# Patient Record
Sex: FEMALE | Race: WHITE
[De-identification: names, ages, dates, MRNs, and addresses within clinical notes are randomized per-mention and may not be internally consistent; named-entity substitution may affect disease eponyms.]

---

## 2019-03-24 ENCOUNTER — HOSPITAL ENCOUNTER (INPATIENT)
Dept: HOSPITAL 15 - ER | Age: 54
LOS: 3 days | Discharge: HOME | DRG: 389 | End: 2019-03-27
Attending: INTERNAL MEDICINE | Admitting: NURSE PRACTITIONER
Payer: COMMERCIAL

## 2019-03-24 VITALS — HEIGHT: 66 IN | BODY MASS INDEX: 34.37 KG/M2 | WEIGHT: 213.85 LBS

## 2019-03-24 DIAGNOSIS — K59.00: ICD-10-CM

## 2019-03-24 DIAGNOSIS — M54.9: ICD-10-CM

## 2019-03-24 DIAGNOSIS — E78.5: ICD-10-CM

## 2019-03-24 DIAGNOSIS — Z85.038: ICD-10-CM

## 2019-03-24 DIAGNOSIS — E11.9: ICD-10-CM

## 2019-03-24 DIAGNOSIS — Z79.84: ICD-10-CM

## 2019-03-24 DIAGNOSIS — Z93.3: ICD-10-CM

## 2019-03-24 DIAGNOSIS — E87.1: ICD-10-CM

## 2019-03-24 DIAGNOSIS — Z79.899: ICD-10-CM

## 2019-03-24 DIAGNOSIS — K56.699: Primary | ICD-10-CM

## 2019-03-24 DIAGNOSIS — J44.9: ICD-10-CM

## 2019-03-24 DIAGNOSIS — Z87.891: ICD-10-CM

## 2019-03-24 DIAGNOSIS — E87.6: ICD-10-CM

## 2019-03-24 DIAGNOSIS — Z90.49: ICD-10-CM

## 2019-03-24 DIAGNOSIS — Z90.710: ICD-10-CM

## 2019-03-24 DIAGNOSIS — G43.909: ICD-10-CM

## 2019-03-24 DIAGNOSIS — I10: ICD-10-CM

## 2019-03-24 LAB
ALBUMIN SERPL-MCNC: 3.5 G/DL (ref 3.4–5)
ALP SERPL-CCNC: 115 U/L (ref 45–117)
ALT SERPL-CCNC: 33 U/L (ref 13–56)
AMYLASE SERPL-CCNC: 28 U/L (ref 25–115)
ANION GAP SERPL CALCULATED.3IONS-SCNC: 16 MMOL/L (ref 5–15)
BILIRUB SERPL-MCNC: 0.6 MG/DL (ref 0.2–1)
BUN SERPL-MCNC: 24 MG/DL (ref 7–18)
BUN/CREAT SERPL: 38.1
CALCIUM SERPL-MCNC: 8.6 MG/DL (ref 8.5–10.1)
CHLORIDE SERPL-SCNC: 104 MMOL/L (ref 98–107)
CO2 SERPL-SCNC: 14 MMOL/L (ref 21–32)
GLUCOSE SERPL-MCNC: 166 MG/DL (ref 74–106)
HCT VFR BLD AUTO: 50 % (ref 36–46)
HGB BLD-MCNC: 16.5 G/DL (ref 12.2–16.2)
LIPASE SERPL-CCNC: 114 U/L (ref 73–393)
MCH RBC QN AUTO: 30 PG (ref 28–32)
MCV RBC AUTO: 90.7 FL (ref 80–100)
NRBC BLD QL AUTO: 0.1 %
POTASSIUM SERPL-SCNC: 3.9 MMOL/L (ref 3.5–5.1)
PROT SERPL-MCNC: 8.3 G/DL (ref 6.4–8.2)
SODIUM SERPL-SCNC: 134 MMOL/L (ref 136–145)

## 2019-03-24 PROCEDURE — 80053 COMPREHEN METABOLIC PANEL: CPT

## 2019-03-24 PROCEDURE — 85025 COMPLETE CBC W/AUTO DIFF WBC: CPT

## 2019-03-24 PROCEDURE — 81001 URINALYSIS AUTO W/SCOPE: CPT

## 2019-03-24 PROCEDURE — 82150 ASSAY OF AMYLASE: CPT

## 2019-03-24 PROCEDURE — 36415 COLL VENOUS BLD VENIPUNCTURE: CPT

## 2019-03-24 PROCEDURE — 74176 CT ABD & PELVIS W/O CONTRAST: CPT

## 2019-03-24 PROCEDURE — 83690 ASSAY OF LIPASE: CPT

## 2019-03-24 PROCEDURE — 84484 ASSAY OF TROPONIN QUANT: CPT

## 2019-03-24 PROCEDURE — 96374 THER/PROPH/DIAG INJ IV PUSH: CPT

## 2019-03-24 PROCEDURE — 83735 ASSAY OF MAGNESIUM: CPT

## 2019-03-24 PROCEDURE — 74250 X-RAY XM SM INT 1CNTRST STD: CPT

## 2019-03-24 PROCEDURE — 96375 TX/PRO/DX INJ NEW DRUG ADDON: CPT

## 2019-03-24 PROCEDURE — 74018 RADEX ABDOMEN 1 VIEW: CPT

## 2019-03-24 PROCEDURE — 80048 BASIC METABOLIC PNL TOTAL CA: CPT

## 2019-03-24 PROCEDURE — 82962 GLUCOSE BLOOD TEST: CPT

## 2019-03-24 PROCEDURE — 83036 HEMOGLOBIN GLYCOSYLATED A1C: CPT

## 2019-03-24 PROCEDURE — 93005 ELECTROCARDIOGRAM TRACING: CPT

## 2019-03-24 RX ADMIN — GABAPENTIN SCH MG: 300 CAPSULE ORAL at 22:00

## 2019-03-24 RX ADMIN — MORPHINE SULFATE PRN MG: 2 INJECTION, SOLUTION INTRAMUSCULAR; INTRAVENOUS at 21:30

## 2019-03-25 VITALS — DIASTOLIC BLOOD PRESSURE: 71 MMHG | SYSTOLIC BLOOD PRESSURE: 152 MMHG

## 2019-03-25 VITALS — SYSTOLIC BLOOD PRESSURE: 119 MMHG | DIASTOLIC BLOOD PRESSURE: 81 MMHG

## 2019-03-25 VITALS — DIASTOLIC BLOOD PRESSURE: 97 MMHG | SYSTOLIC BLOOD PRESSURE: 141 MMHG

## 2019-03-25 LAB
ANION GAP SERPL CALCULATED.3IONS-SCNC: 14 MMOL/L (ref 5–15)
BUN SERPL-MCNC: 28 MG/DL (ref 7–18)
BUN/CREAT SERPL: 51.9
CALCIUM SERPL-MCNC: 8.4 MG/DL (ref 8.5–10.1)
CHLORIDE SERPL-SCNC: 107 MMOL/L (ref 98–107)
CO2 SERPL-SCNC: 18 MMOL/L (ref 21–32)
GLUCOSE SERPL-MCNC: 128 MG/DL (ref 74–106)
HCT VFR BLD AUTO: 48.1 % (ref 36–46)
HGB BLD-MCNC: 16.2 G/DL (ref 12.2–16.2)
MCH RBC QN AUTO: 30.4 PG (ref 28–32)
MCV RBC AUTO: 90.4 FL (ref 80–100)
NRBC BLD QL AUTO: 0.1 %
POTASSIUM SERPL-SCNC: 3.8 MMOL/L (ref 3.5–5.1)
SODIUM SERPL-SCNC: 139 MMOL/L (ref 136–145)

## 2019-03-25 PROCEDURE — 0DH67UZ INSERTION OF FEEDING DEVICE INTO STOMACH, VIA NATURAL OR ARTIFICIAL OPENING: ICD-10-PCS

## 2019-03-25 RX ADMIN — Medication SCH STRIP: at 00:04

## 2019-03-25 RX ADMIN — MORPHINE SULFATE PRN MG: 2 INJECTION, SOLUTION INTRAMUSCULAR; INTRAVENOUS at 21:02

## 2019-03-25 RX ADMIN — SODIUM CHLORIDE SCH MLS/HR: 0.9 INJECTION, SOLUTION INTRAVENOUS at 13:00

## 2019-03-25 RX ADMIN — HUMAN INSULIN SCH UNITS: 100 INJECTION, SOLUTION SUBCUTANEOUS at 06:19

## 2019-03-25 RX ADMIN — GABAPENTIN SCH MG: 300 CAPSULE ORAL at 14:17

## 2019-03-25 RX ADMIN — ATENOLOL SCH MG: 50 TABLET ORAL at 21:09

## 2019-03-25 RX ADMIN — Medication SCH STRIP: at 06:12

## 2019-03-25 RX ADMIN — ONDANSETRON HYDROCHLORIDE PRN MG: 2 INJECTION, SOLUTION INTRAMUSCULAR; INTRAVENOUS at 09:14

## 2019-03-25 RX ADMIN — ATENOLOL SCH MG: 50 TABLET ORAL at 10:46

## 2019-03-25 RX ADMIN — SODIUM CHLORIDE SCH MLS/HR: 0.9 INJECTION, SOLUTION INTRAVENOUS at 23:43

## 2019-03-25 RX ADMIN — Medication SCH STRIP: at 18:19

## 2019-03-25 RX ADMIN — BENAZEPRIL HYDROCHLORIDE SCH MG: 10 TABLET, FILM COATED ORAL at 10:44

## 2019-03-25 RX ADMIN — ONDANSETRON HYDROCHLORIDE PRN MG: 2 INJECTION, SOLUTION INTRAMUSCULAR; INTRAVENOUS at 21:01

## 2019-03-25 RX ADMIN — HUMAN INSULIN SCH UNITS: 100 INJECTION, SOLUTION SUBCUTANEOUS at 18:00

## 2019-03-25 RX ADMIN — PANTOPRAZOLE SODIUM SCH MG: 40 INJECTION, POWDER, FOR SOLUTION INTRAVENOUS at 21:01

## 2019-03-25 RX ADMIN — Medication SCH STRIP: at 12:21

## 2019-03-25 RX ADMIN — HUMAN INSULIN SCH UNITS: 100 INJECTION, SOLUTION SUBCUTANEOUS at 00:13

## 2019-03-25 RX ADMIN — CEFTRIAXONE SODIUM SCH MLS/HR: 1 INJECTION, POWDER, FOR SOLUTION INTRAMUSCULAR; INTRAVENOUS at 09:14

## 2019-03-25 RX ADMIN — GABAPENTIN SCH MG: 300 CAPSULE ORAL at 21:02

## 2019-03-25 RX ADMIN — ONDANSETRON HYDROCHLORIDE PRN MG: 2 INJECTION, SOLUTION INTRAMUSCULAR; INTRAVENOUS at 15:22

## 2019-03-25 RX ADMIN — HUMAN INSULIN SCH UNITS: 100 INJECTION, SOLUTION SUBCUTANEOUS at 12:28

## 2019-03-25 RX ADMIN — GABAPENTIN SCH MG: 300 CAPSULE ORAL at 06:00

## 2019-03-25 RX ADMIN — MORPHINE SULFATE PRN MG: 2 INJECTION, SOLUTION INTRAMUSCULAR; INTRAVENOUS at 15:30

## 2019-03-25 NOTE — NUR
CLOSING NOTES



PT RESTING. DENIES PAIN, NAUSEA OR VOMITING. VERBALIZED COMFORT.  ENDORSED CARE TO ISA WALLACE RN.

## 2019-03-25 NOTE — NUR
MS admit from ER

[place],ROGELIO ANGELES admitted to Telemetry unit after SBAR received.  Patient oriented to Lorin Brunson, primary RN, unit, room, bed, and unit policies regarding patient care and 
visiting hours. Patient placed on bedside oxygen, weighed by bedscale and encouraged to call 
if they need something. All questions and concerns addressed, patient verbalized 
understanding. 

Note: 

NG TUBE ATTACHED TO LOW INTERMITTENT SUCTION. NO DRAINAGE NOTED AT THIS TIME

## 2019-03-25 NOTE — NUR
BLOOD SUGAR 156

INSULIN HELD. PT IS ON CLEAR LIQUID DIET ONLY AND WOULD LIKE TO SEE HOW THE NEXT BLOOD SUGAR 
LEVEL WILL BE TONIGHT

## 2019-03-25 NOTE — NUR
MS admit from [facility],ROGELIO ANGELES admitted to Telemetry unit after SBAR received.  Patient oriented to Lorin Brunson, primary RN, unit, room, bed, and unit policies regarding patient care and 
visiting hours. Patient placed on bedside oxygen, weighed by bedscale and encouraged to call 
if they need something. All questions and concerns addressed, patient verbalized 
understanding. 

Note: 

NG TUBE ATTACHED TO LOW INTERMITTENT SUCTION. NO DRAINAGE NOTED AT THIS TIME

-------------------------------------------------------------------------------

Addendum: 03/25/19 at 1646 by Lorin Brunson RN

-------------------------------------------------------------------------------

DUPLICATE NOTE. WRONG TIME.

## 2019-03-26 VITALS — DIASTOLIC BLOOD PRESSURE: 88 MMHG | SYSTOLIC BLOOD PRESSURE: 152 MMHG

## 2019-03-26 VITALS — SYSTOLIC BLOOD PRESSURE: 160 MMHG | DIASTOLIC BLOOD PRESSURE: 72 MMHG

## 2019-03-26 VITALS — SYSTOLIC BLOOD PRESSURE: 144 MMHG | DIASTOLIC BLOOD PRESSURE: 75 MMHG

## 2019-03-26 VITALS — SYSTOLIC BLOOD PRESSURE: 140 MMHG | DIASTOLIC BLOOD PRESSURE: 78 MMHG

## 2019-03-26 VITALS — DIASTOLIC BLOOD PRESSURE: 79 MMHG | SYSTOLIC BLOOD PRESSURE: 137 MMHG

## 2019-03-26 LAB
ANION GAP SERPL CALCULATED.3IONS-SCNC: 12 MMOL/L (ref 5–15)
BUN SERPL-MCNC: 26 MG/DL (ref 7–18)
BUN/CREAT SERPL: 53.1
CALCIUM SERPL-MCNC: 8 MG/DL (ref 8.5–10.1)
CHLORIDE SERPL-SCNC: 105 MMOL/L (ref 98–107)
CO2 SERPL-SCNC: 19 MMOL/L (ref 21–32)
GLUCOSE SERPL-MCNC: 174 MG/DL (ref 74–106)
HCT VFR BLD AUTO: 46.2 % (ref 36–46)
HGB BLD-MCNC: 15.4 G/DL (ref 12.2–16.2)
MAGNESIUM SERPL-MCNC: 2.1 MG/DL (ref 1.6–2.6)
MCH RBC QN AUTO: 30.1 PG (ref 28–32)
MCV RBC AUTO: 90.4 FL (ref 80–100)
NRBC BLD QL AUTO: 0.1 %
POTASSIUM SERPL-SCNC: 3.2 MMOL/L (ref 3.5–5.1)
SODIUM SERPL-SCNC: 136 MMOL/L (ref 136–145)

## 2019-03-26 RX ADMIN — PANTOPRAZOLE SODIUM SCH MG: 40 INJECTION, POWDER, FOR SOLUTION INTRAVENOUS at 10:30

## 2019-03-26 RX ADMIN — Medication SCH STRIP: at 05:55

## 2019-03-26 RX ADMIN — ONDANSETRON HYDROCHLORIDE PRN MG: 2 INJECTION, SOLUTION INTRAMUSCULAR; INTRAVENOUS at 14:53

## 2019-03-26 RX ADMIN — ONDANSETRON HYDROCHLORIDE PRN MG: 2 INJECTION, SOLUTION INTRAMUSCULAR; INTRAVENOUS at 10:30

## 2019-03-26 RX ADMIN — ATENOLOL SCH MG: 50 TABLET ORAL at 21:40

## 2019-03-26 RX ADMIN — MORPHINE SULFATE PRN MG: 2 INJECTION, SOLUTION INTRAMUSCULAR; INTRAVENOUS at 10:30

## 2019-03-26 RX ADMIN — MORPHINE SULFATE PRN MG: 2 INJECTION, SOLUTION INTRAMUSCULAR; INTRAVENOUS at 23:50

## 2019-03-26 RX ADMIN — Medication SCH STRIP: at 23:50

## 2019-03-26 RX ADMIN — MORPHINE SULFATE PRN MG: 2 INJECTION, SOLUTION INTRAMUSCULAR; INTRAVENOUS at 19:52

## 2019-03-26 RX ADMIN — MORPHINE SULFATE PRN MG: 2 INJECTION, SOLUTION INTRAMUSCULAR; INTRAVENOUS at 05:55

## 2019-03-26 RX ADMIN — SODIUM CHLORIDE SCH MLS/HR: 0.9 INJECTION, SOLUTION INTRAVENOUS at 12:04

## 2019-03-26 RX ADMIN — Medication SCH STRIP: at 00:00

## 2019-03-26 RX ADMIN — BENAZEPRIL HYDROCHLORIDE SCH MG: 10 TABLET, FILM COATED ORAL at 10:36

## 2019-03-26 RX ADMIN — MORPHINE SULFATE PRN MG: 2 INJECTION, SOLUTION INTRAMUSCULAR; INTRAVENOUS at 14:53

## 2019-03-26 RX ADMIN — GABAPENTIN SCH MG: 300 CAPSULE ORAL at 05:54

## 2019-03-26 RX ADMIN — HUMAN INSULIN SCH UNITS: 100 INJECTION, SOLUTION SUBCUTANEOUS at 12:05

## 2019-03-26 RX ADMIN — HUMAN INSULIN SCH UNITS: 100 INJECTION, SOLUTION SUBCUTANEOUS at 00:00

## 2019-03-26 RX ADMIN — ATENOLOL SCH MG: 50 TABLET ORAL at 10:37

## 2019-03-26 RX ADMIN — GABAPENTIN SCH MG: 300 CAPSULE ORAL at 21:39

## 2019-03-26 RX ADMIN — GABAPENTIN SCH MG: 300 CAPSULE ORAL at 14:00

## 2019-03-26 RX ADMIN — PANTOPRAZOLE SODIUM SCH MG: 40 INJECTION, POWDER, FOR SOLUTION INTRAVENOUS at 21:40

## 2019-03-26 RX ADMIN — ONDANSETRON HYDROCHLORIDE PRN MG: 2 INJECTION, SOLUTION INTRAMUSCULAR; INTRAVENOUS at 05:55

## 2019-03-26 RX ADMIN — ONDANSETRON HYDROCHLORIDE PRN MG: 2 INJECTION, SOLUTION INTRAMUSCULAR; INTRAVENOUS at 19:52

## 2019-03-26 RX ADMIN — ONDANSETRON HYDROCHLORIDE PRN MG: 2 INJECTION, SOLUTION INTRAMUSCULAR; INTRAVENOUS at 23:50

## 2019-03-26 RX ADMIN — HUMAN INSULIN SCH UNITS: 100 INJECTION, SOLUTION SUBCUTANEOUS at 06:01

## 2019-03-26 RX ADMIN — HUMAN INSULIN SCH UNITS: 100 INJECTION, SOLUTION SUBCUTANEOUS at 18:00

## 2019-03-26 RX ADMIN — CEFTRIAXONE SODIUM SCH MLS/HR: 1 INJECTION, POWDER, FOR SOLUTION INTRAMUSCULAR; INTRAVENOUS at 10:30

## 2019-03-26 RX ADMIN — Medication SCH STRIP: at 12:04

## 2019-03-26 RX ADMIN — HUMAN INSULIN SCH UNITS: 100 INJECTION, SOLUTION SUBCUTANEOUS at 23:58

## 2019-03-26 RX ADMIN — Medication SCH STRIP: at 18:00

## 2019-03-26 NOTE — NUR
Closing Note: Patient A/O x-4 and not exhibiting  any s/s of distress nor discomfort. Care 
endorsed to Ayesha Vásquez RN.

## 2019-03-26 NOTE — NUR
Opening shift note: Patient is A/O x-4 and laying in bed quietly, call light is within reach 
and bed in low position with side rails up           x-2. Patient is not stating any pain 
nor discomfort at this time. Patient encouraged to call when she starts to feel pain in 
attempt to keep her pain controlled. Patient verbalized understanding.

## 2019-03-27 VITALS — DIASTOLIC BLOOD PRESSURE: 87 MMHG | SYSTOLIC BLOOD PRESSURE: 139 MMHG

## 2019-03-27 VITALS — SYSTOLIC BLOOD PRESSURE: 136 MMHG | DIASTOLIC BLOOD PRESSURE: 74 MMHG

## 2019-03-27 VITALS — DIASTOLIC BLOOD PRESSURE: 78 MMHG | SYSTOLIC BLOOD PRESSURE: 143 MMHG

## 2019-03-27 LAB
ANION GAP SERPL CALCULATED.3IONS-SCNC: 7 MMOL/L (ref 5–15)
BUN SERPL-MCNC: 14 MG/DL (ref 7–18)
BUN/CREAT SERPL: 32.6
CALCIUM SERPL-MCNC: 8.2 MG/DL (ref 8.5–10.1)
CHLORIDE SERPL-SCNC: 99 MMOL/L (ref 98–107)
CO2 SERPL-SCNC: 27 MMOL/L (ref 21–32)
GLUCOSE SERPL-MCNC: 174 MG/DL (ref 74–106)
POTASSIUM SERPL-SCNC: 3.1 MMOL/L (ref 3.5–5.1)
SODIUM SERPL-SCNC: 133 MMOL/L (ref 136–145)

## 2019-03-27 RX ADMIN — ONDANSETRON HYDROCHLORIDE PRN MG: 2 INJECTION, SOLUTION INTRAMUSCULAR; INTRAVENOUS at 11:12

## 2019-03-27 RX ADMIN — GABAPENTIN SCH MG: 300 CAPSULE ORAL at 14:00

## 2019-03-27 RX ADMIN — HUMAN INSULIN SCH UNITS: 100 INJECTION, SOLUTION SUBCUTANEOUS at 05:35

## 2019-03-27 RX ADMIN — Medication SCH STRIP: at 05:16

## 2019-03-27 RX ADMIN — ONDANSETRON HYDROCHLORIDE PRN MG: 2 INJECTION, SOLUTION INTRAMUSCULAR; INTRAVENOUS at 05:16

## 2019-03-27 RX ADMIN — SODIUM CHLORIDE SCH MLS/HR: 0.9 INJECTION, SOLUTION INTRAVENOUS at 04:25

## 2019-03-27 RX ADMIN — PANTOPRAZOLE SODIUM SCH MG: 40 INJECTION, POWDER, FOR SOLUTION INTRAVENOUS at 11:11

## 2019-03-27 RX ADMIN — MORPHINE SULFATE PRN MG: 2 INJECTION, SOLUTION INTRAMUSCULAR; INTRAVENOUS at 11:12

## 2019-03-27 RX ADMIN — HUMAN INSULIN SCH UNITS: 100 INJECTION, SOLUTION SUBCUTANEOUS at 12:00

## 2019-03-27 RX ADMIN — MORPHINE SULFATE PRN MG: 2 INJECTION, SOLUTION INTRAMUSCULAR; INTRAVENOUS at 05:16

## 2019-03-27 RX ADMIN — GABAPENTIN SCH MG: 300 CAPSULE ORAL at 05:16

## 2019-03-27 RX ADMIN — BENAZEPRIL HYDROCHLORIDE SCH MG: 10 TABLET, FILM COATED ORAL at 11:11

## 2019-03-27 RX ADMIN — Medication SCH STRIP: at 12:00

## 2019-03-27 RX ADMIN — ATENOLOL SCH MG: 50 TABLET ORAL at 11:11

## 2019-03-27 NOTE — NUR
Discharge instructions given as ordered. Encourage to follow up with PMD as instructed. All 
questions and concerns addressed. Patient verbalized understanding.  Medication 
reconciliation form completed and copy given to patient. IV removed with catheter intact, 
pressure dressing applied. Patient taken to vehicle via wheelchair with all personal 
belongings, accompanied by staff and family member. No distress noted at time of departure.

## 2019-03-27 NOTE — NUR
Opening shift note: Patient A/O x-4 and resting in bed and not stating any pain nor 
disomfort at this time. Patient encouraged to call if she needs anything.

## 2022-10-31 ENCOUNTER — HOSPITAL ENCOUNTER (EMERGENCY)
Dept: HOSPITAL 15 - ER | Age: 57
Discharge: HOME | End: 2022-10-31
Payer: COMMERCIAL

## 2022-10-31 VITALS — SYSTOLIC BLOOD PRESSURE: 168 MMHG | DIASTOLIC BLOOD PRESSURE: 82 MMHG

## 2022-10-31 VITALS — HEIGHT: 67 IN | WEIGHT: 238.1 LBS | BODY MASS INDEX: 37.37 KG/M2
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
## 2022-10-31 DIAGNOSIS — Z90.710: ICD-10-CM

## 2022-10-31 DIAGNOSIS — S52.501A: Primary | ICD-10-CM

## 2022-10-31 DIAGNOSIS — Z87.891: ICD-10-CM

## 2022-10-31 DIAGNOSIS — Z79.899: ICD-10-CM

## 2022-10-31 DIAGNOSIS — S52.601A: ICD-10-CM
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
## 2022-10-31 DIAGNOSIS — S63.502A: ICD-10-CM

## 2022-10-31 DIAGNOSIS — S83.92XA: ICD-10-CM
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
## 2022-10-31 DIAGNOSIS — I10: ICD-10-CM

## 2022-10-31 DIAGNOSIS — Z90.89: ICD-10-CM

## 2022-10-31 DIAGNOSIS — E11.9: ICD-10-CM
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
## 2022-10-31 DIAGNOSIS — J44.9: ICD-10-CM

## 2022-10-31 DIAGNOSIS — E78.5: ICD-10-CM

## 2022-10-31 PROCEDURE — 29505 APPLICATION LONG LEG SPLINT: CPT

## 2022-10-31 PROCEDURE — 73562 X-RAY EXAM OF KNEE 3: CPT

## 2022-10-31 PROCEDURE — 99284 EMERGENCY DEPT VISIT MOD MDM: CPT

## 2022-10-31 PROCEDURE — 29125 APPL SHORT ARM SPLINT STATIC: CPT

## 2022-10-31 PROCEDURE — 73110 X-RAY EXAM OF WRIST: CPT

## 2022-10-31 PROCEDURE — 73130 X-RAY EXAM OF HAND: CPT

## 2023-03-26 ENCOUNTER — HOSPITAL ENCOUNTER (INPATIENT)
Dept: HOSPITAL 15 - ER | Age: 58
LOS: 9 days | Discharge: HOME | DRG: 872 | End: 2023-04-04
Attending: NURSE PRACTITIONER
Payer: COMMERCIAL

## 2023-03-26 VITALS — BODY MASS INDEX: 35.09 KG/M2 | HEIGHT: 67 IN | WEIGHT: 223.55 LBS

## 2023-03-26 DIAGNOSIS — M19.90: ICD-10-CM

## 2023-03-26 DIAGNOSIS — Z82.49: ICD-10-CM

## 2023-03-26 DIAGNOSIS — Z80.0: ICD-10-CM

## 2023-03-26 DIAGNOSIS — C20: ICD-10-CM

## 2023-03-26 DIAGNOSIS — J44.9: ICD-10-CM

## 2023-03-26 DIAGNOSIS — Z20.822: ICD-10-CM

## 2023-03-26 DIAGNOSIS — Z80.3: ICD-10-CM

## 2023-03-26 DIAGNOSIS — E66.01: ICD-10-CM

## 2023-03-26 DIAGNOSIS — C78.7: ICD-10-CM

## 2023-03-26 DIAGNOSIS — K59.00: ICD-10-CM

## 2023-03-26 DIAGNOSIS — Z80.1: ICD-10-CM

## 2023-03-26 DIAGNOSIS — E11.42: ICD-10-CM

## 2023-03-26 DIAGNOSIS — A41.9: Primary | ICD-10-CM

## 2023-03-26 DIAGNOSIS — M06.9: ICD-10-CM

## 2023-03-26 DIAGNOSIS — G89.4: ICD-10-CM

## 2023-03-26 DIAGNOSIS — Z83.3: ICD-10-CM

## 2023-03-26 DIAGNOSIS — N30.01: ICD-10-CM

## 2023-03-26 DIAGNOSIS — I10: ICD-10-CM

## 2023-03-26 DIAGNOSIS — E78.5: ICD-10-CM

## 2023-03-26 LAB
ALBUMIN SERPL-MCNC: 3.2 G/DL (ref 3.4–5)
ALP SERPL-CCNC: 128 U/L (ref 45–117)
ALT SERPL-CCNC: 29 U/L (ref 13–56)
ANION GAP SERPL CALCULATED.3IONS-SCNC: 8 MMOL/L (ref 5–15)
BILIRUB SERPL-MCNC: 0.5 MG/DL (ref 0.2–1)
BUN SERPL-MCNC: 15 MG/DL (ref 7–18)
BUN/CREAT SERPL: 23.1 (ref 10–20)
CALCIUM SERPL-MCNC: 8.7 MG/DL (ref 8.5–10.1)
CHLORIDE SERPL-SCNC: 108 MMOL/L (ref 98–107)
CHOLEST SERPL-MCNC: 149 MG/DL (ref ?–200)
CO2 SERPL-SCNC: 22 MMOL/L (ref 21–32)
GLUCOSE SERPL-MCNC: 177 MG/DL (ref 74–106)
HCT VFR BLD AUTO: 46.2 % (ref 36–46)
HDLC SERPL-MCNC: 39 MG/DL (ref 40–59)
HGB BLD-MCNC: 15.6 G/DL (ref 12.2–16.2)
MCH RBC QN AUTO: 29.9 PG (ref 28–32)
MCV RBC AUTO: 88.4 FL (ref 80–100)
NRBC BLD QL AUTO: 0.5 %
POTASSIUM SERPL-SCNC: 3.5 MMOL/L (ref 3.5–5.1)
PROT SERPL-MCNC: 8 G/DL (ref 6.4–8.2)
SODIUM SERPL-SCNC: 138 MMOL/L (ref 136–145)
TRIGL SERPL-MCNC: 141 MG/DL (ref ?–150)

## 2023-03-26 PROCEDURE — 85025 COMPLETE CBC W/AUTO DIFF WBC: CPT

## 2023-03-26 PROCEDURE — 83036 HEMOGLOBIN GLYCOSYLATED A1C: CPT

## 2023-03-26 PROCEDURE — 87088 URINE BACTERIA CULTURE: CPT

## 2023-03-26 PROCEDURE — 74176 CT ABD & PELVIS W/O CONTRAST: CPT

## 2023-03-26 PROCEDURE — 81001 URINALYSIS AUTO W/SCOPE: CPT

## 2023-03-26 PROCEDURE — 84132 ASSAY OF SERUM POTASSIUM: CPT

## 2023-03-26 PROCEDURE — 85730 THROMBOPLASTIN TIME PARTIAL: CPT

## 2023-03-26 PROCEDURE — 84443 ASSAY THYROID STIM HORMONE: CPT

## 2023-03-26 PROCEDURE — 76705 ECHO EXAM OF ABDOMEN: CPT

## 2023-03-26 PROCEDURE — 87040 BLOOD CULTURE FOR BACTERIA: CPT

## 2023-03-26 PROCEDURE — 87086 URINE CULTURE/COLONY COUNT: CPT

## 2023-03-26 PROCEDURE — 82962 GLUCOSE BLOOD TEST: CPT

## 2023-03-26 PROCEDURE — 84484 ASSAY OF TROPONIN QUANT: CPT

## 2023-03-26 PROCEDURE — 85610 PROTHROMBIN TIME: CPT

## 2023-03-26 PROCEDURE — 80048 BASIC METABOLIC PNL TOTAL CA: CPT

## 2023-03-26 PROCEDURE — 77012 CT SCAN FOR NEEDLE BIOPSY: CPT

## 2023-03-26 PROCEDURE — 87426 SARSCOV CORONAVIRUS AG IA: CPT

## 2023-03-26 PROCEDURE — 82105 ALPHA-FETOPROTEIN SERUM: CPT

## 2023-03-26 PROCEDURE — 82378 CARCINOEMBRYONIC ANTIGEN: CPT

## 2023-03-26 PROCEDURE — 80061 LIPID PANEL: CPT

## 2023-03-26 PROCEDURE — 10005 FNA BX W/US GDN 1ST LES: CPT

## 2023-03-26 PROCEDURE — 36415 COLL VENOUS BLD VENIPUNCTURE: CPT

## 2023-03-26 PROCEDURE — 80053 COMPREHEN METABOLIC PANEL: CPT

## 2023-03-26 PROCEDURE — 74183 MRI ABD W/O CNTR FLWD CNTR: CPT

## 2023-03-26 RX ADMIN — DICYCLOMINE HYDROCHLORIDE SCH MG: 10 CAPSULE ORAL at 22:05

## 2023-03-26 RX ADMIN — HYDROCODONE BITARTRATE AND ACETAMINOPHEN PRN TAB: 5; 325 TABLET ORAL at 16:37

## 2023-03-26 RX ADMIN — ONDANSETRON HYDROCHLORIDE PRN MG: 2 INJECTION, SOLUTION INTRAMUSCULAR; INTRAVENOUS at 22:11

## 2023-03-26 RX ADMIN — Medication SCH STRIP: at 17:43

## 2023-03-26 RX ADMIN — Medication SCH STRIP: at 22:12

## 2023-03-26 RX ADMIN — ATENOLOL SCH MG: 50 TABLET ORAL at 22:09

## 2023-03-26 RX ADMIN — MORPHINE SULFATE PRN MG: 2 INJECTION, SOLUTION INTRAMUSCULAR; INTRAVENOUS at 22:12

## 2023-03-26 RX ADMIN — HUMAN INSULIN SCH UNITS: 100 INJECTION, SOLUTION SUBCUTANEOUS at 22:21

## 2023-03-26 RX ADMIN — GABAPENTIN SCH MG: 300 CAPSULE ORAL at 22:05

## 2023-03-26 RX ADMIN — HUMAN INSULIN SCH UNITS: 100 INJECTION, SOLUTION SUBCUTANEOUS at 17:48

## 2023-03-26 RX ADMIN — HYDROCODONE BITARTRATE AND ACETAMINOPHEN PRN TAB: 5; 325 TABLET ORAL at 22:52

## 2023-03-27 VITALS — SYSTOLIC BLOOD PRESSURE: 187 MMHG | DIASTOLIC BLOOD PRESSURE: 80 MMHG

## 2023-03-27 VITALS — DIASTOLIC BLOOD PRESSURE: 78 MMHG | SYSTOLIC BLOOD PRESSURE: 186 MMHG

## 2023-03-27 VITALS — SYSTOLIC BLOOD PRESSURE: 189 MMHG | DIASTOLIC BLOOD PRESSURE: 75 MMHG

## 2023-03-27 LAB
ALBUMIN SERPL-MCNC: 2.9 G/DL (ref 3.4–5)
ALP SERPL-CCNC: 113 U/L (ref 45–117)
ALT SERPL-CCNC: 22 U/L (ref 13–56)
ANION GAP SERPL CALCULATED.3IONS-SCNC: 9 MMOL/L (ref 5–15)
BILIRUB SERPL-MCNC: 0.5 MG/DL (ref 0.2–1)
BUN SERPL-MCNC: 18 MG/DL (ref 7–18)
BUN/CREAT SERPL: 40.9 (ref 10–20)
CALCIUM SERPL-MCNC: 8.8 MG/DL (ref 8.5–10.1)
CHLORIDE SERPL-SCNC: 106 MMOL/L (ref 98–107)
CO2 SERPL-SCNC: 23 MMOL/L (ref 21–32)
GLUCOSE SERPL-MCNC: 217 MG/DL (ref 74–106)
HCT VFR BLD AUTO: 43.6 % (ref 36–46)
HGB BLD-MCNC: 14.8 G/DL (ref 12.2–16.2)
MCH RBC QN AUTO: 30 PG (ref 28–32)
MCV RBC AUTO: 88.6 FL (ref 80–100)
NRBC BLD QL AUTO: 0.1 %
POTASSIUM SERPL-SCNC: 3.5 MMOL/L (ref 3.5–5.1)
PROT SERPL-MCNC: 8.1 G/DL (ref 6.4–8.2)
SODIUM SERPL-SCNC: 138 MMOL/L (ref 136–145)

## 2023-03-27 RX ADMIN — DICYCLOMINE HYDROCHLORIDE SCH MG: 10 CAPSULE ORAL at 08:47

## 2023-03-27 RX ADMIN — Medication SCH STRIP: at 21:20

## 2023-03-27 RX ADMIN — MORPHINE SULFATE PRN MG: 2 INJECTION, SOLUTION INTRAMUSCULAR; INTRAVENOUS at 01:56

## 2023-03-27 RX ADMIN — ENOXAPARIN SODIUM SCH MG: 40 INJECTION SUBCUTANEOUS at 09:24

## 2023-03-27 RX ADMIN — HUMAN INSULIN SCH UNITS: 100 INJECTION, SOLUTION SUBCUTANEOUS at 06:50

## 2023-03-27 RX ADMIN — GABAPENTIN SCH MG: 300 CAPSULE ORAL at 21:16

## 2023-03-27 RX ADMIN — ONDANSETRON HYDROCHLORIDE PRN MG: 2 INJECTION, SOLUTION INTRAMUSCULAR; INTRAVENOUS at 06:09

## 2023-03-27 RX ADMIN — HYDROCODONE BITARTRATE AND ACETAMINOPHEN PRN TAB: 5; 325 TABLET ORAL at 09:22

## 2023-03-27 RX ADMIN — Medication SCH STRIP: at 17:46

## 2023-03-27 RX ADMIN — PREGABALIN SCH MG: 25 CAPSULE ORAL at 21:15

## 2023-03-27 RX ADMIN — ONDANSETRON HYDROCHLORIDE PRN MG: 2 INJECTION, SOLUTION INTRAMUSCULAR; INTRAVENOUS at 01:56

## 2023-03-27 RX ADMIN — HUMAN INSULIN SCH UNITS: 100 INJECTION, SOLUTION SUBCUTANEOUS at 16:25

## 2023-03-27 RX ADMIN — GABAPENTIN SCH MG: 300 CAPSULE ORAL at 06:09

## 2023-03-27 RX ADMIN — HYDROMORPHONE HYDROCHLORIDE PRN MG: 2 INJECTION, SOLUTION INTRAMUSCULAR; INTRAVENOUS; SUBCUTANEOUS at 21:12

## 2023-03-27 RX ADMIN — HYDROCODONE BITARTRATE AND ACETAMINOPHEN PRN TAB: 5; 325 TABLET ORAL at 14:23

## 2023-03-27 RX ADMIN — ATENOLOL SCH MG: 50 TABLET ORAL at 21:16

## 2023-03-27 RX ADMIN — Medication SCH STRIP: at 07:06

## 2023-03-27 RX ADMIN — HYDROCODONE BITARTRATE AND ACETAMINOPHEN PRN TAB: 5; 325 TABLET ORAL at 05:02

## 2023-03-27 RX ADMIN — Medication SCH STRIP: at 11:16

## 2023-03-27 RX ADMIN — HYDRALAZINE HYDROCHLORIDE PRN MG: 20 INJECTION INTRAMUSCULAR; INTRAVENOUS at 14:49

## 2023-03-27 RX ADMIN — ATENOLOL SCH MG: 50 TABLET ORAL at 08:47

## 2023-03-27 RX ADMIN — PIPERACILLIN SODIUM AND TAZOBACTAM SODIUM SCH MLS/HR: .375; 3 INJECTION, POWDER, LYOPHILIZED, FOR SOLUTION INTRAVENOUS at 17:52

## 2023-03-27 RX ADMIN — HUMAN INSULIN SCH UNITS: 100 INJECTION, SOLUTION SUBCUTANEOUS at 11:16

## 2023-03-27 RX ADMIN — GABAPENTIN SCH MG: 300 CAPSULE ORAL at 14:21

## 2023-03-28 VITALS — DIASTOLIC BLOOD PRESSURE: 61 MMHG | SYSTOLIC BLOOD PRESSURE: 105 MMHG

## 2023-03-28 VITALS — DIASTOLIC BLOOD PRESSURE: 82 MMHG | SYSTOLIC BLOOD PRESSURE: 175 MMHG

## 2023-03-28 VITALS — SYSTOLIC BLOOD PRESSURE: 146 MMHG | DIASTOLIC BLOOD PRESSURE: 75 MMHG

## 2023-03-28 VITALS — DIASTOLIC BLOOD PRESSURE: 67 MMHG | SYSTOLIC BLOOD PRESSURE: 137 MMHG

## 2023-03-28 VITALS — SYSTOLIC BLOOD PRESSURE: 116 MMHG | DIASTOLIC BLOOD PRESSURE: 63 MMHG

## 2023-03-28 LAB
ALBUMIN SERPL-MCNC: 2.8 G/DL (ref 3.4–5)
ALP SERPL-CCNC: 119 U/L (ref 45–117)
ALT SERPL-CCNC: 24 U/L (ref 13–56)
ANION GAP SERPL CALCULATED.3IONS-SCNC: 7 MMOL/L (ref 5–15)
APTT PPP: 27.6 SEC (ref 24.6–33.4)
BILIRUB SERPL-MCNC: 0.8 MG/DL (ref 0.2–1)
BUN SERPL-MCNC: 26 MG/DL (ref 7–18)
BUN/CREAT SERPL: 38.2 (ref 10–20)
CALCIUM SERPL-MCNC: 8.7 MG/DL (ref 8.5–10.1)
CHLORIDE SERPL-SCNC: 103 MMOL/L (ref 98–107)
CO2 SERPL-SCNC: 25 MMOL/L (ref 21–32)
GLUCOSE SERPL-MCNC: 281 MG/DL (ref 74–106)
HCT VFR BLD AUTO: 44.1 % (ref 36–46)
HGB BLD-MCNC: 14.5 G/DL (ref 12.2–16.2)
INR PPP: 1.18 (ref 0.9–1.15)
MCH RBC QN AUTO: 29 PG (ref 28–32)
MCV RBC AUTO: 88.1 FL (ref 80–100)
NRBC BLD QL AUTO: 0.1 %
POTASSIUM SERPL-SCNC: 3.1 MMOL/L (ref 3.5–5.1)
PROT SERPL-MCNC: 7.8 G/DL (ref 6.4–8.2)
SODIUM SERPL-SCNC: 135 MMOL/L (ref 136–145)

## 2023-03-28 RX ADMIN — HYDROCODONE BITARTRATE AND ACETAMINOPHEN PRN TAB: 5; 325 TABLET ORAL at 04:47

## 2023-03-28 RX ADMIN — PREGABALIN SCH MG: 25 CAPSULE ORAL at 21:53

## 2023-03-28 RX ADMIN — HYDROCODONE BITARTRATE AND ACETAMINOPHEN PRN TAB: 5; 325 TABLET ORAL at 19:00

## 2023-03-28 RX ADMIN — PIPERACILLIN SODIUM AND TAZOBACTAM SODIUM SCH MLS/HR: .375; 3 INJECTION, POWDER, LYOPHILIZED, FOR SOLUTION INTRAVENOUS at 15:58

## 2023-03-28 RX ADMIN — GABAPENTIN SCH MG: 300 CAPSULE ORAL at 06:25

## 2023-03-28 RX ADMIN — Medication SCH STRIP: at 21:54

## 2023-03-28 RX ADMIN — HUMAN INSULIN SCH UNITS: 100 INJECTION, SOLUTION SUBCUTANEOUS at 21:55

## 2023-03-28 RX ADMIN — ATENOLOL SCH MG: 50 TABLET ORAL at 21:54

## 2023-03-28 RX ADMIN — HUMAN INSULIN SCH UNITS: 100 INJECTION, SOLUTION SUBCUTANEOUS at 06:32

## 2023-03-28 RX ADMIN — GABAPENTIN SCH MG: 300 CAPSULE ORAL at 15:58

## 2023-03-28 RX ADMIN — HYDROCODONE BITARTRATE AND ACETAMINOPHEN PRN TAB: 5; 325 TABLET ORAL at 12:01

## 2023-03-28 RX ADMIN — PIPERACILLIN SODIUM AND TAZOBACTAM SODIUM SCH MLS/HR: .375; 3 INJECTION, POWDER, LYOPHILIZED, FOR SOLUTION INTRAVENOUS at 11:26

## 2023-03-28 RX ADMIN — HYDRALAZINE HYDROCHLORIDE PRN MG: 20 INJECTION INTRAMUSCULAR; INTRAVENOUS at 12:01

## 2023-03-28 RX ADMIN — HYDROMORPHONE HYDROCHLORIDE PRN MG: 2 INJECTION, SOLUTION INTRAMUSCULAR; INTRAVENOUS; SUBCUTANEOUS at 10:37

## 2023-03-28 RX ADMIN — ATENOLOL SCH MG: 50 TABLET ORAL at 10:39

## 2023-03-28 RX ADMIN — ENOXAPARIN SODIUM SCH MG: 40 INJECTION SUBCUTANEOUS at 10:00

## 2023-03-28 RX ADMIN — PREGABALIN SCH MG: 25 CAPSULE ORAL at 10:56

## 2023-03-28 RX ADMIN — PIPERACILLIN SODIUM AND TAZOBACTAM SODIUM SCH MLS/HR: .375; 3 INJECTION, POWDER, LYOPHILIZED, FOR SOLUTION INTRAVENOUS at 06:26

## 2023-03-28 RX ADMIN — Medication SCH STRIP: at 06:32

## 2023-03-28 RX ADMIN — ONDANSETRON HYDROCHLORIDE PRN MG: 2 INJECTION, SOLUTION INTRAMUSCULAR; INTRAVENOUS at 21:02

## 2023-03-28 RX ADMIN — Medication SCH STRIP: at 10:57

## 2023-03-28 RX ADMIN — GABAPENTIN SCH MG: 300 CAPSULE ORAL at 21:54

## 2023-03-28 RX ADMIN — PIPERACILLIN SODIUM AND TAZOBACTAM SODIUM SCH MLS/HR: .375; 3 INJECTION, POWDER, LYOPHILIZED, FOR SOLUTION INTRAVENOUS at 00:26

## 2023-03-28 RX ADMIN — HYDROMORPHONE HYDROCHLORIDE PRN MG: 2 INJECTION, SOLUTION INTRAMUSCULAR; INTRAVENOUS; SUBCUTANEOUS at 16:10

## 2023-03-28 RX ADMIN — ONDANSETRON HYDROCHLORIDE PRN MG: 2 INJECTION, SOLUTION INTRAMUSCULAR; INTRAVENOUS at 16:16

## 2023-03-28 RX ADMIN — HYDROMORPHONE HYDROCHLORIDE PRN MG: 2 INJECTION, SOLUTION INTRAMUSCULAR; INTRAVENOUS; SUBCUTANEOUS at 21:02

## 2023-03-28 RX ADMIN — Medication SCH STRIP: at 16:10

## 2023-03-28 RX ADMIN — ONDANSETRON HYDROCHLORIDE PRN MG: 2 INJECTION, SOLUTION INTRAMUSCULAR; INTRAVENOUS at 10:35

## 2023-03-28 RX ADMIN — HUMAN INSULIN SCH UNITS: 100 INJECTION, SOLUTION SUBCUTANEOUS at 10:59

## 2023-03-28 RX ADMIN — HUMAN INSULIN SCH UNITS: 100 INJECTION, SOLUTION SUBCUTANEOUS at 16:13

## 2023-03-29 VITALS — DIASTOLIC BLOOD PRESSURE: 69 MMHG | SYSTOLIC BLOOD PRESSURE: 118 MMHG

## 2023-03-29 VITALS — SYSTOLIC BLOOD PRESSURE: 173 MMHG | DIASTOLIC BLOOD PRESSURE: 91 MMHG

## 2023-03-29 VITALS — DIASTOLIC BLOOD PRESSURE: 72 MMHG | SYSTOLIC BLOOD PRESSURE: 128 MMHG

## 2023-03-29 VITALS — SYSTOLIC BLOOD PRESSURE: 103 MMHG | DIASTOLIC BLOOD PRESSURE: 54 MMHG

## 2023-03-29 LAB
ALBUMIN SERPL-MCNC: 2.9 G/DL (ref 3.4–5)
ALP SERPL-CCNC: 125 U/L (ref 45–117)
ALT SERPL-CCNC: 31 U/L (ref 13–56)
ANION GAP SERPL CALCULATED.3IONS-SCNC: 10 MMOL/L (ref 5–15)
BILIRUB SERPL-MCNC: 0.6 MG/DL (ref 0.2–1)
BUN SERPL-MCNC: 27 MG/DL (ref 7–18)
BUN/CREAT SERPL: 31.4 (ref 10–20)
CALCIUM SERPL-MCNC: 8.4 MG/DL (ref 8.5–10.1)
CHLORIDE SERPL-SCNC: 101 MMOL/L (ref 98–107)
CO2 SERPL-SCNC: 24 MMOL/L (ref 21–32)
GLUCOSE SERPL-MCNC: 207 MG/DL (ref 74–106)
HCT VFR BLD AUTO: 45 % (ref 36–46)
HGB BLD-MCNC: 15.3 G/DL (ref 12.2–16.2)
MCH RBC QN AUTO: 29.7 PG (ref 28–32)
MCV RBC AUTO: 87.6 FL (ref 80–100)
NRBC BLD QL AUTO: 0 %
POTASSIUM SERPL-SCNC: 3.7 MMOL/L (ref 3.5–5.1)
PROT SERPL-MCNC: 7.5 G/DL (ref 6.4–8.2)
SODIUM SERPL-SCNC: 135 MMOL/L (ref 136–145)

## 2023-03-29 PROCEDURE — 0FB13ZX EXCISION OF RIGHT LOBE LIVER, PERCUTANEOUS APPROACH, DIAGNOSTIC: ICD-10-PCS | Performed by: RADIOLOGY

## 2023-03-29 RX ADMIN — PREGABALIN SCH MG: 25 CAPSULE ORAL at 22:37

## 2023-03-29 RX ADMIN — GABAPENTIN SCH MG: 300 CAPSULE ORAL at 22:36

## 2023-03-29 RX ADMIN — HYDROMORPHONE HYDROCHLORIDE PRN MG: 2 INJECTION, SOLUTION INTRAMUSCULAR; INTRAVENOUS; SUBCUTANEOUS at 23:10

## 2023-03-29 RX ADMIN — ONDANSETRON HYDROCHLORIDE PRN MG: 2 INJECTION, SOLUTION INTRAMUSCULAR; INTRAVENOUS at 11:53

## 2023-03-29 RX ADMIN — GABAPENTIN SCH MG: 300 CAPSULE ORAL at 12:55

## 2023-03-29 RX ADMIN — HYDROCODONE BITARTRATE AND ACETAMINOPHEN PRN TAB: 5; 325 TABLET ORAL at 00:04

## 2023-03-29 RX ADMIN — ONDANSETRON HYDROCHLORIDE PRN MG: 2 INJECTION, SOLUTION INTRAMUSCULAR; INTRAVENOUS at 23:00

## 2023-03-29 RX ADMIN — ENOXAPARIN SODIUM SCH MG: 40 INJECTION SUBCUTANEOUS at 09:06

## 2023-03-29 RX ADMIN — HUMAN INSULIN SCH UNITS: 100 INJECTION, SOLUTION SUBCUTANEOUS at 12:46

## 2023-03-29 RX ADMIN — HYDROMORPHONE HYDROCHLORIDE PRN MG: 2 INJECTION, SOLUTION INTRAMUSCULAR; INTRAVENOUS; SUBCUTANEOUS at 18:40

## 2023-03-29 RX ADMIN — GABAPENTIN SCH MG: 300 CAPSULE ORAL at 06:00

## 2023-03-29 RX ADMIN — MEROPENEM SCH MLS/HR: 1 INJECTION, POWDER, FOR SOLUTION INTRAVENOUS at 12:57

## 2023-03-29 RX ADMIN — Medication SCH STRIP: at 18:41

## 2023-03-29 RX ADMIN — MEROPENEM SCH MLS/HR: 1 INJECTION, POWDER, FOR SOLUTION INTRAVENOUS at 22:00

## 2023-03-29 RX ADMIN — ONDANSETRON HYDROCHLORIDE PRN MG: 2 INJECTION, SOLUTION INTRAMUSCULAR; INTRAVENOUS at 04:14

## 2023-03-29 RX ADMIN — PIPERACILLIN SODIUM AND TAZOBACTAM SODIUM SCH MLS/HR: .375; 3 INJECTION, POWDER, LYOPHILIZED, FOR SOLUTION INTRAVENOUS at 00:04

## 2023-03-29 RX ADMIN — HUMAN INSULIN SCH UNITS: 100 INJECTION, SOLUTION SUBCUTANEOUS at 22:00

## 2023-03-29 RX ADMIN — HYDRALAZINE HYDROCHLORIDE PRN MG: 20 INJECTION INTRAMUSCULAR; INTRAVENOUS at 11:54

## 2023-03-29 RX ADMIN — PREGABALIN SCH MG: 25 CAPSULE ORAL at 09:06

## 2023-03-29 RX ADMIN — ATENOLOL SCH MG: 50 TABLET ORAL at 09:11

## 2023-03-29 RX ADMIN — Medication SCH STRIP: at 06:05

## 2023-03-29 RX ADMIN — HUMAN INSULIN SCH UNITS: 100 INJECTION, SOLUTION SUBCUTANEOUS at 06:08

## 2023-03-29 RX ADMIN — HUMAN INSULIN SCH UNITS: 100 INJECTION, SOLUTION SUBCUTANEOUS at 18:44

## 2023-03-29 RX ADMIN — Medication SCH STRIP: at 12:44

## 2023-03-29 RX ADMIN — DIPHENOXYLATE HYDROCHLORIDE AND ATROPINE SULFATE PRN MG: .025; 2.5 TABLET ORAL at 11:53

## 2023-03-29 RX ADMIN — HYDROMORPHONE HYDROCHLORIDE PRN MG: 2 INJECTION, SOLUTION INTRAMUSCULAR; INTRAVENOUS; SUBCUTANEOUS at 11:37

## 2023-03-29 RX ADMIN — HYDROMORPHONE HYDROCHLORIDE PRN MG: 2 INJECTION, SOLUTION INTRAMUSCULAR; INTRAVENOUS; SUBCUTANEOUS at 04:15

## 2023-03-29 RX ADMIN — DIPHENOXYLATE HYDROCHLORIDE AND ATROPINE SULFATE PRN MG: .025; 2.5 TABLET ORAL at 23:01

## 2023-03-29 RX ADMIN — PIPERACILLIN SODIUM AND TAZOBACTAM SODIUM SCH MLS/HR: .375; 3 INJECTION, POWDER, LYOPHILIZED, FOR SOLUTION INTRAVENOUS at 06:04

## 2023-03-29 RX ADMIN — Medication SCH STRIP: at 22:00

## 2023-03-29 RX ADMIN — ATENOLOL SCH MG: 50 TABLET ORAL at 22:37

## 2023-03-30 VITALS — SYSTOLIC BLOOD PRESSURE: 114 MMHG | DIASTOLIC BLOOD PRESSURE: 66 MMHG

## 2023-03-30 VITALS — SYSTOLIC BLOOD PRESSURE: 148 MMHG | DIASTOLIC BLOOD PRESSURE: 71 MMHG

## 2023-03-30 VITALS — SYSTOLIC BLOOD PRESSURE: 142 MMHG | DIASTOLIC BLOOD PRESSURE: 70 MMHG

## 2023-03-30 VITALS — DIASTOLIC BLOOD PRESSURE: 74 MMHG | SYSTOLIC BLOOD PRESSURE: 124 MMHG

## 2023-03-30 VITALS — SYSTOLIC BLOOD PRESSURE: 130 MMHG | DIASTOLIC BLOOD PRESSURE: 69 MMHG

## 2023-03-30 LAB
ANION GAP SERPL CALCULATED.3IONS-SCNC: 5 MMOL/L (ref 5–15)
BUN SERPL-MCNC: 17 MG/DL (ref 7–18)
BUN/CREAT SERPL: 28.8 (ref 10–20)
CALCIUM SERPL-MCNC: 8 MG/DL (ref 8.5–10.1)
CHLORIDE SERPL-SCNC: 104 MMOL/L (ref 98–107)
CO2 SERPL-SCNC: 27 MMOL/L (ref 21–32)
GLUCOSE SERPL-MCNC: 334 MG/DL (ref 74–106)
HCT VFR BLD AUTO: 42 % (ref 36–46)
HGB BLD-MCNC: 14 G/DL (ref 12.2–16.2)
MCH RBC QN AUTO: 29.5 PG (ref 28–32)
MCV RBC AUTO: 88.8 FL (ref 80–100)
NRBC BLD QL AUTO: 0.1 %
POTASSIUM SERPL-SCNC: 3.4 MMOL/L (ref 3.5–5.1)
SODIUM SERPL-SCNC: 136 MMOL/L (ref 136–145)

## 2023-03-30 RX ADMIN — PREGABALIN SCH MG: 25 CAPSULE ORAL at 10:01

## 2023-03-30 RX ADMIN — PREGABALIN SCH MG: 25 CAPSULE ORAL at 22:01

## 2023-03-30 RX ADMIN — HUMAN INSULIN SCH UNITS: 100 INJECTION, SOLUTION SUBCUTANEOUS at 06:45

## 2023-03-30 RX ADMIN — Medication SCH STRIP: at 13:40

## 2023-03-30 RX ADMIN — HUMAN INSULIN SCH UNITS: 100 INJECTION, SOLUTION SUBCUTANEOUS at 11:30

## 2023-03-30 RX ADMIN — ONDANSETRON HYDROCHLORIDE PRN MG: 2 INJECTION, SOLUTION INTRAMUSCULAR; INTRAVENOUS at 10:00

## 2023-03-30 RX ADMIN — INSULIN GLARGINE SCH UNITS: 100 INJECTION, SOLUTION SUBCUTANEOUS at 10:22

## 2023-03-30 RX ADMIN — MEROPENEM SCH MLS/HR: 1 INJECTION, POWDER, FOR SOLUTION INTRAVENOUS at 14:14

## 2023-03-30 RX ADMIN — ATENOLOL SCH MG: 50 TABLET ORAL at 22:07

## 2023-03-30 RX ADMIN — HYDROMORPHONE HYDROCHLORIDE PRN MG: 2 INJECTION, SOLUTION INTRAMUSCULAR; INTRAVENOUS; SUBCUTANEOUS at 04:54

## 2023-03-30 RX ADMIN — MEROPENEM SCH MLS/HR: 1 INJECTION, POWDER, FOR SOLUTION INTRAVENOUS at 22:08

## 2023-03-30 RX ADMIN — HUMAN INSULIN SCH UNITS: 100 INJECTION, SOLUTION SUBCUTANEOUS at 22:00

## 2023-03-30 RX ADMIN — HYDROMORPHONE HYDROCHLORIDE PRN MG: 2 INJECTION, SOLUTION INTRAMUSCULAR; INTRAVENOUS; SUBCUTANEOUS at 22:25

## 2023-03-30 RX ADMIN — DIPHENOXYLATE HYDROCHLORIDE AND ATROPINE SULFATE PRN MG: .025; 2.5 TABLET ORAL at 04:55

## 2023-03-30 RX ADMIN — GABAPENTIN SCH MG: 300 CAPSULE ORAL at 14:14

## 2023-03-30 RX ADMIN — HYDROMORPHONE HYDROCHLORIDE PRN MG: 2 INJECTION, SOLUTION INTRAMUSCULAR; INTRAVENOUS; SUBCUTANEOUS at 10:03

## 2023-03-30 RX ADMIN — ATENOLOL SCH MG: 50 TABLET ORAL at 10:01

## 2023-03-30 RX ADMIN — ONDANSETRON HYDROCHLORIDE PRN MG: 2 INJECTION, SOLUTION INTRAMUSCULAR; INTRAVENOUS at 04:54

## 2023-03-30 RX ADMIN — HYDROMORPHONE HYDROCHLORIDE PRN MG: 2 INJECTION, SOLUTION INTRAMUSCULAR; INTRAVENOUS; SUBCUTANEOUS at 14:21

## 2023-03-30 RX ADMIN — ONDANSETRON HYDROCHLORIDE PRN MG: 2 INJECTION, SOLUTION INTRAMUSCULAR; INTRAVENOUS at 22:20

## 2023-03-30 RX ADMIN — GABAPENTIN SCH MG: 300 CAPSULE ORAL at 06:34

## 2023-03-30 RX ADMIN — HUMAN INSULIN SCH UNITS: 100 INJECTION, SOLUTION SUBCUTANEOUS at 18:19

## 2023-03-30 RX ADMIN — Medication SCH STRIP: at 22:02

## 2023-03-30 RX ADMIN — Medication SCH STRIP: at 06:45

## 2023-03-30 RX ADMIN — Medication SCH STRIP: at 18:18

## 2023-03-30 RX ADMIN — ENOXAPARIN SODIUM SCH MG: 40 INJECTION SUBCUTANEOUS at 09:58

## 2023-03-30 RX ADMIN — OXYCODONE HYDROCHLORIDE AND ACETAMINOPHEN PRN TAB: 5; 325 TABLET ORAL at 18:31

## 2023-03-30 RX ADMIN — MEROPENEM SCH MLS/HR: 1 INJECTION, POWDER, FOR SOLUTION INTRAVENOUS at 06:39

## 2023-03-30 RX ADMIN — GABAPENTIN SCH MG: 300 CAPSULE ORAL at 22:01

## 2023-03-31 VITALS — SYSTOLIC BLOOD PRESSURE: 168 MMHG | DIASTOLIC BLOOD PRESSURE: 82 MMHG

## 2023-03-31 VITALS — SYSTOLIC BLOOD PRESSURE: 121 MMHG | DIASTOLIC BLOOD PRESSURE: 75 MMHG

## 2023-03-31 VITALS — SYSTOLIC BLOOD PRESSURE: 143 MMHG | DIASTOLIC BLOOD PRESSURE: 69 MMHG

## 2023-03-31 VITALS — SYSTOLIC BLOOD PRESSURE: 155 MMHG | DIASTOLIC BLOOD PRESSURE: 79 MMHG

## 2023-03-31 RX ADMIN — MEROPENEM SCH MLS/HR: 1 INJECTION, POWDER, FOR SOLUTION INTRAVENOUS at 21:50

## 2023-03-31 RX ADMIN — GABAPENTIN SCH MG: 300 CAPSULE ORAL at 14:04

## 2023-03-31 RX ADMIN — HYDROMORPHONE HYDROCHLORIDE PRN MG: 2 INJECTION, SOLUTION INTRAMUSCULAR; INTRAVENOUS; SUBCUTANEOUS at 05:29

## 2023-03-31 RX ADMIN — HUMAN INSULIN SCH UNITS: 100 INJECTION, SOLUTION SUBCUTANEOUS at 12:06

## 2023-03-31 RX ADMIN — OXYCODONE HYDROCHLORIDE AND ACETAMINOPHEN PRN TAB: 5; 325 TABLET ORAL at 16:39

## 2023-03-31 RX ADMIN — OXYCODONE HYDROCHLORIDE AND ACETAMINOPHEN PRN TAB: 5; 325 TABLET ORAL at 10:18

## 2023-03-31 RX ADMIN — FLUCONAZOLE SCH MG: 100 TABLET ORAL at 09:56

## 2023-03-31 RX ADMIN — GABAPENTIN SCH MG: 300 CAPSULE ORAL at 06:29

## 2023-03-31 RX ADMIN — Medication SCH STRIP: at 18:04

## 2023-03-31 RX ADMIN — MEROPENEM SCH MLS/HR: 1 INJECTION, POWDER, FOR SOLUTION INTRAVENOUS at 06:30

## 2023-03-31 RX ADMIN — ATENOLOL SCH MG: 50 TABLET ORAL at 09:56

## 2023-03-31 RX ADMIN — HUMAN INSULIN SCH UNITS: 100 INJECTION, SOLUTION SUBCUTANEOUS at 18:05

## 2023-03-31 RX ADMIN — MEROPENEM SCH MLS/HR: 1 INJECTION, POWDER, FOR SOLUTION INTRAVENOUS at 14:05

## 2023-03-31 RX ADMIN — GABAPENTIN SCH MG: 300 CAPSULE ORAL at 21:19

## 2023-03-31 RX ADMIN — ATENOLOL SCH MG: 50 TABLET ORAL at 21:19

## 2023-03-31 RX ADMIN — Medication SCH STRIP: at 11:57

## 2023-03-31 RX ADMIN — HUMAN INSULIN SCH UNITS: 100 INJECTION, SOLUTION SUBCUTANEOUS at 21:34

## 2023-03-31 RX ADMIN — PREGABALIN SCH MG: 25 CAPSULE ORAL at 09:56

## 2023-03-31 RX ADMIN — HYDROMORPHONE HYDROCHLORIDE PRN MG: 2 INJECTION, SOLUTION INTRAMUSCULAR; INTRAVENOUS; SUBCUTANEOUS at 22:59

## 2023-03-31 RX ADMIN — INSULIN GLARGINE SCH UNITS: 100 INJECTION, SOLUTION SUBCUTANEOUS at 10:00

## 2023-03-31 RX ADMIN — ONDANSETRON HYDROCHLORIDE PRN MG: 2 INJECTION, SOLUTION INTRAMUSCULAR; INTRAVENOUS at 05:30

## 2023-03-31 RX ADMIN — PREGABALIN SCH MG: 25 CAPSULE ORAL at 21:40

## 2023-03-31 RX ADMIN — OXYCODONE HYDROCHLORIDE AND ACETAMINOPHEN PRN TAB: 5; 325 TABLET ORAL at 21:18

## 2023-03-31 RX ADMIN — HYDRALAZINE HYDROCHLORIDE PRN MG: 20 INJECTION INTRAMUSCULAR; INTRAVENOUS at 12:03

## 2023-03-31 RX ADMIN — OXYCODONE HYDROCHLORIDE AND ACETAMINOPHEN PRN TAB: 5; 325 TABLET ORAL at 02:11

## 2023-03-31 RX ADMIN — HUMAN INSULIN SCH UNITS: 100 INJECTION, SOLUTION SUBCUTANEOUS at 06:41

## 2023-03-31 RX ADMIN — HYDRALAZINE HYDROCHLORIDE PRN MG: 20 INJECTION INTRAMUSCULAR; INTRAVENOUS at 22:04

## 2023-03-31 RX ADMIN — ONDANSETRON HYDROCHLORIDE PRN MG: 2 INJECTION, SOLUTION INTRAMUSCULAR; INTRAVENOUS at 22:17

## 2023-03-31 RX ADMIN — ENOXAPARIN SODIUM SCH MG: 40 INJECTION SUBCUTANEOUS at 10:00

## 2023-03-31 RX ADMIN — Medication SCH STRIP: at 21:21

## 2023-03-31 RX ADMIN — Medication SCH STRIP: at 06:29

## 2023-04-01 VITALS — DIASTOLIC BLOOD PRESSURE: 62 MMHG | SYSTOLIC BLOOD PRESSURE: 115 MMHG

## 2023-04-01 VITALS — DIASTOLIC BLOOD PRESSURE: 71 MMHG | SYSTOLIC BLOOD PRESSURE: 133 MMHG

## 2023-04-01 VITALS — SYSTOLIC BLOOD PRESSURE: 106 MMHG | DIASTOLIC BLOOD PRESSURE: 80 MMHG

## 2023-04-01 VITALS — SYSTOLIC BLOOD PRESSURE: 141 MMHG | DIASTOLIC BLOOD PRESSURE: 75 MMHG

## 2023-04-01 VITALS — SYSTOLIC BLOOD PRESSURE: 199 MMHG | DIASTOLIC BLOOD PRESSURE: 82 MMHG

## 2023-04-01 LAB
HCT VFR BLD AUTO: 43.6 % (ref 36–46)
HGB BLD-MCNC: 14.5 G/DL (ref 12.2–16.2)
MCH RBC QN AUTO: 29.5 PG (ref 28–32)
MCV RBC AUTO: 88.4 FL (ref 80–100)
NRBC BLD QL AUTO: 0.1 %

## 2023-04-01 RX ADMIN — MEROPENEM SCH MLS/HR: 1 INJECTION, POWDER, FOR SOLUTION INTRAVENOUS at 13:58

## 2023-04-01 RX ADMIN — ENOXAPARIN SODIUM SCH MG: 40 INJECTION SUBCUTANEOUS at 12:36

## 2023-04-01 RX ADMIN — OXYCODONE HYDROCHLORIDE AND ACETAMINOPHEN PRN TAB: 5; 325 TABLET ORAL at 05:39

## 2023-04-01 RX ADMIN — DOCUSATE SODIUM SCH MG: 100 CAPSULE, LIQUID FILLED ORAL at 21:38

## 2023-04-01 RX ADMIN — Medication SCH STRIP: at 05:59

## 2023-04-01 RX ADMIN — HUMAN INSULIN SCH UNITS: 100 INJECTION, SOLUTION SUBCUTANEOUS at 17:59

## 2023-04-01 RX ADMIN — HYDROMORPHONE HYDROCHLORIDE PRN MG: 2 INJECTION, SOLUTION INTRAMUSCULAR; INTRAVENOUS; SUBCUTANEOUS at 11:35

## 2023-04-01 RX ADMIN — ATENOLOL SCH MG: 50 TABLET ORAL at 21:39

## 2023-04-01 RX ADMIN — ONDANSETRON HYDROCHLORIDE PRN MG: 2 INJECTION, SOLUTION INTRAMUSCULAR; INTRAVENOUS at 05:39

## 2023-04-01 RX ADMIN — ONDANSETRON HYDROCHLORIDE PRN MG: 2 INJECTION, SOLUTION INTRAMUSCULAR; INTRAVENOUS at 11:18

## 2023-04-01 RX ADMIN — ATENOLOL SCH MG: 50 TABLET ORAL at 12:34

## 2023-04-01 RX ADMIN — HUMAN INSULIN SCH UNITS: 100 INJECTION, SOLUTION SUBCUTANEOUS at 12:09

## 2023-04-01 RX ADMIN — MEROPENEM SCH MLS/HR: 1 INJECTION, POWDER, FOR SOLUTION INTRAVENOUS at 21:36

## 2023-04-01 RX ADMIN — HUMAN INSULIN SCH UNITS: 100 INJECTION, SOLUTION SUBCUTANEOUS at 21:47

## 2023-04-01 RX ADMIN — PREGABALIN SCH MG: 25 CAPSULE ORAL at 12:31

## 2023-04-01 RX ADMIN — INSULIN GLARGINE SCH UNITS: 100 INJECTION, SOLUTION SUBCUTANEOUS at 12:15

## 2023-04-01 RX ADMIN — FLUCONAZOLE SCH MG: 100 TABLET ORAL at 12:28

## 2023-04-01 RX ADMIN — GABAPENTIN SCH MG: 300 CAPSULE ORAL at 05:30

## 2023-04-01 RX ADMIN — MEROPENEM SCH MLS/HR: 1 INJECTION, POWDER, FOR SOLUTION INTRAVENOUS at 05:29

## 2023-04-01 RX ADMIN — GABAPENTIN SCH MG: 300 CAPSULE ORAL at 21:38

## 2023-04-01 RX ADMIN — Medication SCH STRIP: at 21:40

## 2023-04-01 RX ADMIN — PREGABALIN SCH MG: 25 CAPSULE ORAL at 21:37

## 2023-04-01 RX ADMIN — Medication SCH STRIP: at 12:08

## 2023-04-01 RX ADMIN — HYDROMORPHONE HYDROCHLORIDE PRN MG: 2 INJECTION, SOLUTION INTRAMUSCULAR; INTRAVENOUS; SUBCUTANEOUS at 15:38

## 2023-04-01 RX ADMIN — Medication SCH STRIP: at 17:58

## 2023-04-01 RX ADMIN — HUMAN INSULIN SCH UNITS: 100 INJECTION, SOLUTION SUBCUTANEOUS at 06:01

## 2023-04-01 RX ADMIN — OXYCODONE HYDROCHLORIDE AND ACETAMINOPHEN PRN TAB: 5; 325 TABLET ORAL at 14:06

## 2023-04-01 RX ADMIN — GABAPENTIN SCH MG: 300 CAPSULE ORAL at 13:58

## 2023-04-02 VITALS — DIASTOLIC BLOOD PRESSURE: 79 MMHG | SYSTOLIC BLOOD PRESSURE: 162 MMHG

## 2023-04-02 VITALS — DIASTOLIC BLOOD PRESSURE: 71 MMHG | SYSTOLIC BLOOD PRESSURE: 126 MMHG

## 2023-04-02 VITALS — SYSTOLIC BLOOD PRESSURE: 142 MMHG | DIASTOLIC BLOOD PRESSURE: 72 MMHG

## 2023-04-02 VITALS — DIASTOLIC BLOOD PRESSURE: 57 MMHG | SYSTOLIC BLOOD PRESSURE: 126 MMHG

## 2023-04-02 VITALS — DIASTOLIC BLOOD PRESSURE: 76 MMHG | SYSTOLIC BLOOD PRESSURE: 131 MMHG

## 2023-04-02 RX ADMIN — PREGABALIN SCH MG: 25 CAPSULE ORAL at 10:32

## 2023-04-02 RX ADMIN — HUMAN INSULIN SCH UNITS: 100 INJECTION, SOLUTION SUBCUTANEOUS at 06:34

## 2023-04-02 RX ADMIN — INSULIN GLARGINE SCH UNITS: 100 INJECTION, SOLUTION SUBCUTANEOUS at 10:39

## 2023-04-02 RX ADMIN — HUMAN INSULIN SCH UNITS: 100 INJECTION, SOLUTION SUBCUTANEOUS at 12:06

## 2023-04-02 RX ADMIN — Medication SCH STRIP: at 17:00

## 2023-04-02 RX ADMIN — Medication SCH STRIP: at 21:55

## 2023-04-02 RX ADMIN — HUMAN INSULIN SCH UNITS: 100 INJECTION, SOLUTION SUBCUTANEOUS at 22:01

## 2023-04-02 RX ADMIN — MEROPENEM SCH MLS/HR: 1 INJECTION, POWDER, FOR SOLUTION INTRAVENOUS at 06:21

## 2023-04-02 RX ADMIN — ENOXAPARIN SODIUM SCH MG: 40 INJECTION SUBCUTANEOUS at 10:34

## 2023-04-02 RX ADMIN — DOCUSATE SODIUM SCH MG: 100 CAPSULE, LIQUID FILLED ORAL at 10:33

## 2023-04-02 RX ADMIN — GABAPENTIN SCH MG: 300 CAPSULE ORAL at 06:21

## 2023-04-02 RX ADMIN — FLUCONAZOLE SCH MG: 100 TABLET ORAL at 10:33

## 2023-04-02 RX ADMIN — ATENOLOL SCH MG: 50 TABLET ORAL at 10:33

## 2023-04-02 RX ADMIN — GABAPENTIN SCH MG: 300 CAPSULE ORAL at 15:00

## 2023-04-02 RX ADMIN — Medication SCH STRIP: at 11:30

## 2023-04-02 RX ADMIN — MEROPENEM SCH MLS/HR: 1 INJECTION, POWDER, FOR SOLUTION INTRAVENOUS at 14:48

## 2023-04-02 RX ADMIN — GABAPENTIN SCH MG: 300 CAPSULE ORAL at 21:53

## 2023-04-02 RX ADMIN — Medication SCH STRIP: at 06:33

## 2023-04-02 RX ADMIN — OXYCODONE HYDROCHLORIDE AND ACETAMINOPHEN PRN TAB: 5; 325 TABLET ORAL at 01:14

## 2023-04-02 RX ADMIN — HUMAN INSULIN SCH UNITS: 100 INJECTION, SOLUTION SUBCUTANEOUS at 18:21

## 2023-04-02 RX ADMIN — DOCUSATE SODIUM SCH MG: 100 CAPSULE, LIQUID FILLED ORAL at 21:54

## 2023-04-02 RX ADMIN — ATENOLOL SCH MG: 50 TABLET ORAL at 21:54

## 2023-04-02 RX ADMIN — MEROPENEM SCH MLS/HR: 1 INJECTION, POWDER, FOR SOLUTION INTRAVENOUS at 21:52

## 2023-04-02 RX ADMIN — PREGABALIN SCH MG: 25 CAPSULE ORAL at 21:53

## 2023-04-02 RX ADMIN — OXYCODONE HYDROCHLORIDE AND ACETAMINOPHEN PRN TAB: 5; 325 TABLET ORAL at 12:02

## 2023-04-02 RX ADMIN — OXYCODONE HYDROCHLORIDE AND ACETAMINOPHEN PRN TAB: 5; 325 TABLET ORAL at 18:20

## 2023-04-03 VITALS — DIASTOLIC BLOOD PRESSURE: 79 MMHG | SYSTOLIC BLOOD PRESSURE: 140 MMHG

## 2023-04-03 VITALS — DIASTOLIC BLOOD PRESSURE: 105 MMHG | SYSTOLIC BLOOD PRESSURE: 180 MMHG

## 2023-04-03 VITALS — SYSTOLIC BLOOD PRESSURE: 110 MMHG | DIASTOLIC BLOOD PRESSURE: 68 MMHG

## 2023-04-03 VITALS — DIASTOLIC BLOOD PRESSURE: 86 MMHG | SYSTOLIC BLOOD PRESSURE: 161 MMHG

## 2023-04-03 VITALS — SYSTOLIC BLOOD PRESSURE: 123 MMHG | DIASTOLIC BLOOD PRESSURE: 66 MMHG

## 2023-04-03 LAB
ANION GAP SERPL CALCULATED.3IONS-SCNC: 7 MMOL/L (ref 5–15)
BUN SERPL-MCNC: 21 MG/DL (ref 7–18)
BUN/CREAT SERPL: 53.8 (ref 10–20)
CALCIUM SERPL-MCNC: 8.4 MG/DL (ref 8.5–10.1)
CHLORIDE SERPL-SCNC: 103 MMOL/L (ref 98–107)
CO2 SERPL-SCNC: 29 MMOL/L (ref 21–32)
GLUCOSE SERPL-MCNC: 95 MG/DL (ref 74–106)
HCT VFR BLD AUTO: 41.3 % (ref 36–46)
HGB BLD-MCNC: 13.9 G/DL (ref 12.2–16.2)
MCH RBC QN AUTO: 29.5 PG (ref 28–32)
MCV RBC AUTO: 87.4 FL (ref 80–100)
NRBC BLD QL AUTO: 0.2 %
POTASSIUM SERPL-SCNC: 3.7 MMOL/L (ref 3.5–5.1)
SODIUM SERPL-SCNC: 139 MMOL/L (ref 136–145)

## 2023-04-03 RX ADMIN — Medication SCH ML: at 21:56

## 2023-04-03 RX ADMIN — OXYCODONE HYDROCHLORIDE AND ACETAMINOPHEN PRN TAB: 5; 325 TABLET ORAL at 20:19

## 2023-04-03 RX ADMIN — GABAPENTIN SCH MG: 300 CAPSULE ORAL at 13:44

## 2023-04-03 RX ADMIN — DOCUSATE SODIUM SCH MG: 100 CAPSULE, LIQUID FILLED ORAL at 21:57

## 2023-04-03 RX ADMIN — OXYCODONE HYDROCHLORIDE AND ACETAMINOPHEN PRN TAB: 5; 325 TABLET ORAL at 13:47

## 2023-04-03 RX ADMIN — OXYCODONE HYDROCHLORIDE AND ACETAMINOPHEN PRN TAB: 5; 325 TABLET ORAL at 06:15

## 2023-04-03 RX ADMIN — GABAPENTIN SCH MG: 300 CAPSULE ORAL at 06:15

## 2023-04-03 RX ADMIN — ENOXAPARIN SODIUM SCH MG: 40 INJECTION SUBCUTANEOUS at 12:00

## 2023-04-03 RX ADMIN — HUMAN INSULIN SCH UNITS: 100 INJECTION, SOLUTION SUBCUTANEOUS at 18:22

## 2023-04-03 RX ADMIN — Medication SCH STRIP: at 21:56

## 2023-04-03 RX ADMIN — Medication SCH STRIP: at 11:30

## 2023-04-03 RX ADMIN — PREGABALIN SCH MG: 25 CAPSULE ORAL at 12:00

## 2023-04-03 RX ADMIN — HUMAN INSULIN SCH UNITS: 100 INJECTION, SOLUTION SUBCUTANEOUS at 06:33

## 2023-04-03 RX ADMIN — PREGABALIN SCH MG: 25 CAPSULE ORAL at 21:57

## 2023-04-03 RX ADMIN — HUMAN INSULIN SCH UNITS: 100 INJECTION, SOLUTION SUBCUTANEOUS at 13:17

## 2023-04-03 RX ADMIN — Medication SCH STRIP: at 17:00

## 2023-04-03 RX ADMIN — GABAPENTIN SCH MG: 300 CAPSULE ORAL at 21:57

## 2023-04-03 RX ADMIN — HUMAN INSULIN SCH UNITS: 100 INJECTION, SOLUTION SUBCUTANEOUS at 22:01

## 2023-04-03 RX ADMIN — ONDANSETRON HYDROCHLORIDE PRN MG: 2 INJECTION, SOLUTION INTRAMUSCULAR; INTRAVENOUS at 06:15

## 2023-04-03 RX ADMIN — DOCUSATE SODIUM SCH MG: 100 CAPSULE, LIQUID FILLED ORAL at 12:00

## 2023-04-03 RX ADMIN — INSULIN GLARGINE SCH UNITS: 100 INJECTION, SOLUTION SUBCUTANEOUS at 12:00

## 2023-04-03 RX ADMIN — OXYCODONE HYDROCHLORIDE AND ACETAMINOPHEN PRN TAB: 5; 325 TABLET ORAL at 00:11

## 2023-04-03 RX ADMIN — Medication SCH ML: at 13:16

## 2023-04-03 RX ADMIN — MEROPENEM SCH MLS/HR: 1 INJECTION, POWDER, FOR SOLUTION INTRAVENOUS at 06:14

## 2023-04-03 RX ADMIN — Medication SCH STRIP: at 06:32

## 2023-04-04 VITALS — SYSTOLIC BLOOD PRESSURE: 136 MMHG | DIASTOLIC BLOOD PRESSURE: 70 MMHG

## 2023-04-04 VITALS — SYSTOLIC BLOOD PRESSURE: 115 MMHG | DIASTOLIC BLOOD PRESSURE: 64 MMHG

## 2023-04-04 VITALS — DIASTOLIC BLOOD PRESSURE: 84 MMHG | SYSTOLIC BLOOD PRESSURE: 156 MMHG

## 2023-04-04 VITALS — SYSTOLIC BLOOD PRESSURE: 156 MMHG | DIASTOLIC BLOOD PRESSURE: 84 MMHG

## 2023-04-04 LAB
ALBUMIN SERPL-MCNC: 2.6 G/DL (ref 3.4–5)
ALP SERPL-CCNC: 114 U/L (ref 45–117)
ALT SERPL-CCNC: 27 U/L (ref 13–56)
ANION GAP SERPL CALCULATED.3IONS-SCNC: 6 MMOL/L (ref 5–15)
BILIRUB SERPL-MCNC: 0.6 MG/DL (ref 0.2–1)
BUN SERPL-MCNC: 26 MG/DL (ref 7–18)
BUN/CREAT SERPL: 51 (ref 10–20)
CALCIUM SERPL-MCNC: 8.4 MG/DL (ref 8.5–10.1)
CHLORIDE SERPL-SCNC: 99 MMOL/L (ref 98–107)
CO2 SERPL-SCNC: 29 MMOL/L (ref 21–32)
GLUCOSE SERPL-MCNC: 201 MG/DL (ref 74–106)
POTASSIUM SERPL-SCNC: 3.7 MMOL/L (ref 3.5–5.1)
PROT SERPL-MCNC: 6.5 G/DL (ref 6.4–8.2)
SODIUM SERPL-SCNC: 134 MMOL/L (ref 136–145)

## 2023-04-04 RX ADMIN — DOCUSATE SODIUM SCH MG: 100 CAPSULE, LIQUID FILLED ORAL at 09:20

## 2023-04-04 RX ADMIN — ENOXAPARIN SODIUM SCH MG: 40 INJECTION SUBCUTANEOUS at 09:27

## 2023-04-04 RX ADMIN — HUMAN INSULIN SCH UNITS: 100 INJECTION, SOLUTION SUBCUTANEOUS at 06:20

## 2023-04-04 RX ADMIN — Medication SCH STRIP: at 06:14

## 2023-04-04 RX ADMIN — OXYCODONE HYDROCHLORIDE AND ACETAMINOPHEN PRN TAB: 5; 325 TABLET ORAL at 02:49

## 2023-04-04 RX ADMIN — OXYCODONE HYDROCHLORIDE AND ACETAMINOPHEN PRN TAB: 5; 325 TABLET ORAL at 13:37

## 2023-04-04 RX ADMIN — HUMAN INSULIN SCH UNITS: 100 INJECTION, SOLUTION SUBCUTANEOUS at 12:28

## 2023-04-04 RX ADMIN — PREGABALIN SCH MG: 25 CAPSULE ORAL at 09:21

## 2023-04-04 RX ADMIN — INSULIN GLARGINE SCH UNITS: 100 INJECTION, SOLUTION SUBCUTANEOUS at 10:07

## 2023-04-04 RX ADMIN — Medication SCH STRIP: at 12:26

## 2023-04-04 RX ADMIN — GABAPENTIN SCH MG: 300 CAPSULE ORAL at 06:23

## 2023-04-04 RX ADMIN — GABAPENTIN SCH MG: 300 CAPSULE ORAL at 13:36

## 2023-04-04 RX ADMIN — Medication SCH ML: at 09:29

## 2023-04-04 RX ADMIN — OXYCODONE HYDROCHLORIDE AND ACETAMINOPHEN PRN TAB: 5; 325 TABLET ORAL at 09:23

## 2023-06-03 ENCOUNTER — HOSPITAL ENCOUNTER (INPATIENT)
Dept: HOSPITAL 15 - ER | Age: 58
LOS: 1 days | Discharge: LEFT BEFORE DISCHARGE | DRG: 918 | End: 2023-06-04
Attending: NURSE PRACTITIONER | Admitting: NURSE PRACTITIONER
Payer: COMMERCIAL

## 2023-06-03 VITALS — HEIGHT: 67 IN | BODY MASS INDEX: 34.6 KG/M2 | WEIGHT: 220.46 LBS

## 2023-06-03 DIAGNOSIS — Z90.710: ICD-10-CM
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
## 2023-06-03 DIAGNOSIS — I10: ICD-10-CM

## 2023-06-03 DIAGNOSIS — K62.89: ICD-10-CM

## 2023-06-03 DIAGNOSIS — E87.6: ICD-10-CM

## 2023-06-03 DIAGNOSIS — Z83.42: ICD-10-CM

## 2023-06-03 DIAGNOSIS — T39.1X1A: ICD-10-CM

## 2023-06-03 DIAGNOSIS — Z85.05: ICD-10-CM

## 2023-06-03 DIAGNOSIS — Z87.891: ICD-10-CM

## 2023-06-03 DIAGNOSIS — Z85.048: ICD-10-CM

## 2023-06-03 DIAGNOSIS — Z80.0: ICD-10-CM

## 2023-06-03 DIAGNOSIS — K76.82: ICD-10-CM

## 2023-06-03 DIAGNOSIS — E78.5: ICD-10-CM

## 2023-06-03 DIAGNOSIS — E66.9: ICD-10-CM

## 2023-06-03 DIAGNOSIS — D68.9: ICD-10-CM

## 2023-06-03 DIAGNOSIS — J44.9: ICD-10-CM

## 2023-06-03 DIAGNOSIS — Z80.1: ICD-10-CM

## 2023-06-03 DIAGNOSIS — Z93.3: ICD-10-CM

## 2023-06-03 DIAGNOSIS — Z83.3: ICD-10-CM

## 2023-06-03 DIAGNOSIS — K52.9: ICD-10-CM

## 2023-06-03 DIAGNOSIS — D72.829: ICD-10-CM

## 2023-06-03 DIAGNOSIS — Z80.3: ICD-10-CM

## 2023-06-03 DIAGNOSIS — T40.2X1A: Primary | ICD-10-CM

## 2023-06-03 DIAGNOSIS — E11.40: ICD-10-CM

## 2023-06-03 DIAGNOSIS — Z82.49: ICD-10-CM

## 2023-06-03 LAB
ALBUMIN SERPL-MCNC: 2.1 G/DL (ref 3.4–5)
ALCOHOL, URINE: < 3 MG/DL (ref 0–10)
ALP SERPL-CCNC: 313 U/L (ref 45–117)
ALT SERPL-CCNC: 59 U/L (ref 13–56)
AMPHETAMINES UR QL SCN: NEGATIVE
ANION GAP SERPL CALCULATED.3IONS-SCNC: 11 MMOL/L (ref 5–15)
APAP SERPL-MCNC: < 2 UG/ML (ref 10–30)
BARBITURATES UR QL SCN: NEGATIVE
BENZODIAZ UR QL SCN: NEGATIVE
BILIRUB SERPL-MCNC: 1 MG/DL (ref 0.2–1)
BUN SERPL-MCNC: 13 MG/DL (ref 7–18)
BUN/CREAT SERPL: 31.7 (ref 10–20)
BZE UR QL SCN: NEGATIVE
CALCIUM SERPL-MCNC: 8 MG/DL (ref 8.5–10.1)
CANNABINOIDS UR QL SCN: POSITIVE
CHLORIDE SERPL-SCNC: 97 MMOL/L (ref 98–107)
CO2 SERPL-SCNC: 25 MMOL/L (ref 21–32)
GLUCOSE SERPL-MCNC: 240 MG/DL (ref 74–106)
HCT VFR BLD AUTO: 38.5 % (ref 36–46)
HGB BLD-MCNC: 12.7 G/DL (ref 12.2–16.2)
INR PPP: 1.37 (ref 0.9–1.15)
LACTATE PLASV-SCNC: 2.2 MMOL/L (ref 0.4–2)
MCH RBC QN AUTO: 28 PG (ref 28–32)
MCV RBC AUTO: 84.5 FL (ref 80–100)
NRBC BLD QL AUTO: 0 %
OPIATES UR QL SCN: POSITIVE
PCP UR QL SCN: NEGATIVE
POTASSIUM SERPL-SCNC: 2.9 MMOL/L (ref 3.5–5.1)
PROT SERPL-MCNC: 6.9 G/DL (ref 6.4–8.2)
SALICYLATES SERPL-MCNC: 3.1 MG/DL (ref 2.8–20)
SODIUM SERPL-SCNC: 133 MMOL/L (ref 136–145)

## 2023-06-03 PROCEDURE — 71045 X-RAY EXAM CHEST 1 VIEW: CPT

## 2023-06-03 PROCEDURE — 83605 ASSAY OF LACTIC ACID: CPT

## 2023-06-03 PROCEDURE — 96375 TX/PRO/DX INJ NEW DRUG ADDON: CPT

## 2023-06-03 PROCEDURE — 81001 URINALYSIS AUTO W/SCOPE: CPT

## 2023-06-03 PROCEDURE — 96365 THER/PROPH/DIAG IV INF INIT: CPT

## 2023-06-03 PROCEDURE — 36415 COLL VENOUS BLD VENIPUNCTURE: CPT

## 2023-06-03 PROCEDURE — 70450 CT HEAD/BRAIN W/O DYE: CPT

## 2023-06-03 PROCEDURE — 83880 ASSAY OF NATRIURETIC PEPTIDE: CPT

## 2023-06-03 PROCEDURE — 80329 ANALGESICS NON-OPIOID 1 OR 2: CPT

## 2023-06-03 PROCEDURE — 82962 GLUCOSE BLOOD TEST: CPT

## 2023-06-03 PROCEDURE — 84484 ASSAY OF TROPONIN QUANT: CPT

## 2023-06-03 PROCEDURE — 85025 COMPLETE CBC W/AUTO DIFF WBC: CPT

## 2023-06-03 PROCEDURE — 80053 COMPREHEN METABOLIC PANEL: CPT

## 2023-06-03 PROCEDURE — 96372 THER/PROPH/DIAG INJ SC/IM: CPT

## 2023-06-03 PROCEDURE — 87040 BLOOD CULTURE FOR BACTERIA: CPT

## 2023-06-03 PROCEDURE — 85610 PROTHROMBIN TIME: CPT

## 2023-06-03 PROCEDURE — 82140 ASSAY OF AMMONIA: CPT

## 2023-06-03 PROCEDURE — 74176 CT ABD & PELVIS W/O CONTRAST: CPT

## 2023-06-03 PROCEDURE — 80307 DRUG TEST PRSMV CHEM ANLYZR: CPT

## 2023-06-03 RX ADMIN — GABAPENTIN SCH MG: 300 CAPSULE ORAL at 22:39

## 2023-06-03 RX ADMIN — Medication SCH STRIP: at 17:00

## 2023-06-03 RX ADMIN — SODIUM CHLORIDE SCH ML: 9 INJECTION INTRAMUSCULAR; INTRAVENOUS; SUBCUTANEOUS at 22:45

## 2023-06-03 RX ADMIN — HYDROCODONE BITARTRATE AND ACETAMINOPHEN PRN TAB: 5; 325 TABLET ORAL at 17:23

## 2023-06-03 RX ADMIN — Medication SCH STRIP: at 22:30

## 2023-06-03 RX ADMIN — HUMAN INSULIN SCH UNITS: 100 INJECTION, SOLUTION SUBCUTANEOUS at 17:00

## 2023-06-03 RX ADMIN — HYDROMORPHONE HYDROCHLORIDE PRN MG: 2 INJECTION, SOLUTION INTRAMUSCULAR; INTRAVENOUS; SUBCUTANEOUS at 22:53

## 2023-06-03 RX ADMIN — ATENOLOL SCH MG: 50 TABLET ORAL at 22:45

## 2023-06-04 VITALS — DIASTOLIC BLOOD PRESSURE: 84 MMHG | SYSTOLIC BLOOD PRESSURE: 178 MMHG

## 2023-06-04 LAB
ALBUMIN SERPL-MCNC: 2.2 G/DL (ref 3.4–5)
ALP SERPL-CCNC: 290 U/L (ref 45–117)
ALT SERPL-CCNC: 47 U/L (ref 13–56)
ANION GAP SERPL CALCULATED.3IONS-SCNC: 9 MMOL/L (ref 5–15)
BILIRUB SERPL-MCNC: 0.9 MG/DL (ref 0.2–1)
BUN SERPL-MCNC: 14 MG/DL (ref 7–18)
BUN/CREAT SERPL: 29.8 (ref 10–20)
CALCIUM SERPL-MCNC: 7.6 MG/DL (ref 8.5–10.1)
CHLORIDE SERPL-SCNC: 100 MMOL/L (ref 98–107)
CO2 SERPL-SCNC: 29 MMOL/L (ref 21–32)
GLUCOSE SERPL-MCNC: 213 MG/DL (ref 74–106)
HCT VFR BLD AUTO: 36.9 % (ref 36–46)
HGB BLD-MCNC: 12.3 G/DL (ref 12.2–16.2)
MCH RBC QN AUTO: 28.1 PG (ref 28–32)
MCV RBC AUTO: 84.1 FL (ref 80–100)
NRBC BLD QL AUTO: 0.1 %
POTASSIUM SERPL-SCNC: 2.7 MMOL/L (ref 3.5–5.1)
PROT SERPL-MCNC: 6.6 G/DL (ref 6.4–8.2)
SODIUM SERPL-SCNC: 138 MMOL/L (ref 136–145)

## 2023-06-04 RX ADMIN — HYDRALAZINE HYDROCHLORIDE PRN MG: 20 INJECTION INTRAMUSCULAR; INTRAVENOUS at 09:05

## 2023-06-04 RX ADMIN — HUMAN INSULIN SCH UNITS: 100 INJECTION, SOLUTION SUBCUTANEOUS at 07:00

## 2023-06-04 RX ADMIN — SODIUM CHLORIDE SCH ML: 9 INJECTION INTRAMUSCULAR; INTRAVENOUS; SUBCUTANEOUS at 06:46

## 2023-06-04 RX ADMIN — ATENOLOL SCH MG: 50 TABLET ORAL at 10:44

## 2023-06-04 RX ADMIN — Medication SCH STRIP: at 06:49

## 2023-06-04 RX ADMIN — HYDROCODONE BITARTRATE AND ACETAMINOPHEN PRN TAB: 5; 325 TABLET ORAL at 02:13

## 2023-06-04 RX ADMIN — HYDRALAZINE HYDROCHLORIDE PRN MG: 20 INJECTION INTRAMUSCULAR; INTRAVENOUS at 03:05

## 2023-06-04 RX ADMIN — GABAPENTIN SCH MG: 300 CAPSULE ORAL at 06:46

## 2023-06-04 RX ADMIN — HYDROMORPHONE HYDROCHLORIDE PRN MG: 2 INJECTION, SOLUTION INTRAMUSCULAR; INTRAVENOUS; SUBCUTANEOUS at 07:15
